# Patient Record
Sex: MALE | Race: WHITE | Employment: FULL TIME | URBAN - METROPOLITAN AREA
[De-identification: names, ages, dates, MRNs, and addresses within clinical notes are randomized per-mention and may not be internally consistent; named-entity substitution may affect disease eponyms.]

---

## 2017-01-06 ENCOUNTER — TELEPHONE (OUTPATIENT)
Dept: INTERNAL MEDICINE CLINIC | Facility: CLINIC | Age: 52
End: 2017-01-06

## 2017-01-06 NOTE — TELEPHONE ENCOUNTER
Discussed with patient. Patient has been under much stress at work. He is having difficulty with dealing with the stress and how he feels part of the time. I discussed this with him at his last visit with me in November.   I have given him Xanax but he h

## 2017-02-10 ENCOUNTER — OFFICE VISIT (OUTPATIENT)
Dept: OPTOMETRY | Facility: CLINIC | Age: 52
End: 2017-02-10

## 2017-02-10 DIAGNOSIS — H52.203 MYOPIA WITH ASTIGMATISM, BILATERAL: Primary | ICD-10-CM

## 2017-02-10 DIAGNOSIS — H52.13 MYOPIA WITH ASTIGMATISM, BILATERAL: Primary | ICD-10-CM

## 2017-02-10 PROCEDURE — 92012 INTRM OPH EXAM EST PATIENT: CPT | Performed by: OPTOMETRIST

## 2017-02-10 PROCEDURE — 92015 DETERMINE REFRACTIVE STATE: CPT | Performed by: OPTOMETRIST

## 2017-02-10 NOTE — PROGRESS NOTES
Tabby Matthews is a 46year old male. HPI:     HPI     Patient is in for an annual contact lens exam . Patient is happy with his monovision lens. Wears only a lens in the right eye for distance and no lens OS to read.  Tried a +.75 lens but does not wea Normal            Additional Tests     Amsler      Right Left   Amsler Normal Normal               Slit Lamp and Fundus Exam     External Exam      Right Left    External Normal Normal      Slit Lamp Exam      Right Left    Lids/Lashes Normal Ptosis    Con

## 2017-09-21 ENCOUNTER — OFFICE VISIT (OUTPATIENT)
Dept: INTERNAL MEDICINE CLINIC | Facility: CLINIC | Age: 52
End: 2017-09-21

## 2017-09-21 VITALS
BODY MASS INDEX: 27 KG/M2 | HEART RATE: 60 BPM | WEIGHT: 175.38 LBS | DIASTOLIC BLOOD PRESSURE: 66 MMHG | TEMPERATURE: 98 F | OXYGEN SATURATION: 98 % | SYSTOLIC BLOOD PRESSURE: 124 MMHG

## 2017-09-21 DIAGNOSIS — Z80.0 FAMILY HISTORY OF COLON CANCER: ICD-10-CM

## 2017-09-21 DIAGNOSIS — Z00.00 ANNUAL PHYSICAL EXAM: Primary | ICD-10-CM

## 2017-09-21 DIAGNOSIS — Z80.42 FAMILY HISTORY OF PROSTATE CANCER: ICD-10-CM

## 2017-09-21 DIAGNOSIS — K57.30 DIVERTICULOSIS OF COLON: ICD-10-CM

## 2017-09-21 LAB
OCCULT BLOOD, STOOL 1: NEGATIVE
PERFORMANCE MONITORS CORRECT (YES/NO): YES YES/NO

## 2017-09-21 PROCEDURE — 93005 ELECTROCARDIOGRAM TRACING: CPT | Performed by: INTERNAL MEDICINE

## 2017-09-21 PROCEDURE — 99214 OFFICE O/P EST MOD 30 MIN: CPT | Performed by: INTERNAL MEDICINE

## 2017-09-21 PROCEDURE — 99212 OFFICE O/P EST SF 10 MIN: CPT | Performed by: INTERNAL MEDICINE

## 2017-09-21 PROCEDURE — 82272 OCCULT BLD FECES 1-3 TESTS: CPT | Performed by: INTERNAL MEDICINE

## 2017-09-21 PROCEDURE — 93000 ELECTROCARDIOGRAM COMPLETE: CPT | Performed by: INTERNAL MEDICINE

## 2017-09-21 PROCEDURE — 90686 IIV4 VACC NO PRSV 0.5 ML IM: CPT | Performed by: INTERNAL MEDICINE

## 2017-09-21 PROCEDURE — 99396 PREV VISIT EST AGE 40-64: CPT | Performed by: INTERNAL MEDICINE

## 2017-09-21 PROCEDURE — 90471 IMMUNIZATION ADMIN: CPT | Performed by: INTERNAL MEDICINE

## 2017-09-21 NOTE — PROGRESS NOTES
Name and  verified. Consent form completed. Flulaval administered Left deltoid. Patient tolerated w/o complaint. No adverse reactions noted. VIS given to patient.

## 2017-09-21 NOTE — PATIENT INSTRUCTIONS
1.  Patient is to continue his current diet, medications and activity. 2.  Patient will be given his flu vaccine today. 3.  I will plan to see the patient back in 1 year for his annual physical examination.   4.  I will see the patient back sooner as kwame

## 2017-09-22 NOTE — PROGRESS NOTES
Roselyn Gonzalez is a 46year old male who presents for a complete physical exam.   HPI:   Mr. Larry Helton is a 63-year-old white male who was seen by me on September 21, 2017 for his annual physical examination.   At the time the examination Mr. Jorge Luis Zapata palpitations  GI: denies abdominal pain, N/V, diarrhea, constipation, hematochezia or melena  : No urinary complaints  NEURO: denies headaches or dizziness    EXAM:   /66 (BP Location: Left arm, Patient Position: Sitting, Cuff Size: adult)   Pulse diverticulosis. I will see the patient back sooner as necessary.    - ELECTROCARDIOGRAM, COMPLETE  - BLD OCLT PROXIDASE ACTV QUAL FECES 1 SPEC    2. Family history of prostate cancer  Stable.   CPM.  Patient's prostate examination revealed the prostate to

## 2017-12-02 ENCOUNTER — HOSPITAL ENCOUNTER (OUTPATIENT)
Age: 52
Discharge: HOME | End: 2017-12-02
Attending: EMERGENCY MEDICINE
Payer: COMMERCIAL

## 2017-12-02 VITALS
BODY MASS INDEX: 26.68 KG/M2 | TEMPERATURE: 98 F | HEART RATE: 80 BPM | DIASTOLIC BLOOD PRESSURE: 63 MMHG | SYSTOLIC BLOOD PRESSURE: 120 MMHG | OXYGEN SATURATION: 100 % | WEIGHT: 170 LBS | HEIGHT: 67 IN | RESPIRATION RATE: 18 BRPM

## 2017-12-02 DIAGNOSIS — J01.40 ACUTE NON-RECURRENT PANSINUSITIS: Primary | ICD-10-CM

## 2017-12-02 PROCEDURE — 99213 OFFICE O/P EST LOW 20 MIN: CPT

## 2017-12-02 PROCEDURE — 99202 OFFICE O/P NEW SF 15 MIN: CPT

## 2017-12-02 RX ORDER — AMOXICILLIN 875 MG/1
875 TABLET, COATED ORAL 2 TIMES DAILY
Qty: 20 TABLET | Refills: 0 | Status: SHIPPED | OUTPATIENT
Start: 2017-12-02 | End: 2017-12-12

## 2017-12-02 NOTE — ED PROVIDER NOTES
Patient Seen in: [address] [facility]    History   Patient presents with:  Cough/URI    Stated Complaint: sinus pressure    HPI    Patient is a 26-year-old male with past history of arthritis who presents now with the above.   The p and flat bilaterally. There is no posterior pharyngeal erythema.   Mild diffuse bilateral maxillary and frontal sinus tenderness to palpation  Chest: Clear to auscultation, no tenderness  Cardiovascular: Regular rate and rhythm without murmur  Abdomen: Sof

## 2017-12-02 NOTE — ED INITIAL ASSESSMENT (HPI)
Cold symptoms about 3 weeks ago. Seemed to improve but has persistent sinus pressure. Worse at night. Green mucus. Post nasal drip. No nausea. No fever. C/o facial pressure. No dizziness. Feels \"foggy\".

## 2017-12-04 ENCOUNTER — TELEPHONE (OUTPATIENT)
Dept: INTERNAL MEDICINE CLINIC | Facility: CLINIC | Age: 52
End: 2017-12-04

## 2017-12-04 NOTE — TELEPHONE ENCOUNTER
To DR BURNS and DR PADILLA  ( DR BURNS at [facility] off tomorrow)    Pt states has had a lot of nasal congestion x 2 1/2 wks  - been using afrin nasal spray multiple times a day for the 2 1/2 weeks   Went to [facility] sat and was given Amoxicillin 875 mg bid x 10 da

## 2017-12-05 NOTE — TELEPHONE ENCOUNTER
Would stop afrin-not meant to be used this long. Would try flonase 2 sprays each nostril and zyrtec daily.

## 2018-04-23 ENCOUNTER — OFFICE VISIT (OUTPATIENT)
Dept: OPTOMETRY | Facility: CLINIC | Age: 53
End: 2018-04-23

## 2018-04-23 DIAGNOSIS — H52.13 MYOPIA OF BOTH EYES WITH ASTIGMATISM: Primary | ICD-10-CM

## 2018-04-23 DIAGNOSIS — H52.203 MYOPIA OF BOTH EYES WITH ASTIGMATISM: Primary | ICD-10-CM

## 2018-04-23 PROCEDURE — 92012 INTRM OPH EXAM EST PATIENT: CPT | Performed by: OPTOMETRIST

## 2018-04-23 PROCEDURE — 92015 DETERMINE REFRACTIVE STATE: CPT | Performed by: OPTOMETRIST

## 2018-04-23 NOTE — PROGRESS NOTES
Kishore Newby is a 48year old male. HPI:     HPI     Patient is in for an annual contact lens exam. He wears AV Oasys toric in the monovision technique--wears only a left lens for distance and no lens in the right eye to read .  I tried a plus powered Yes    Mood/Affect:  Normal            Slit Lamp and Fundus Exam     External Exam       Right Left    External Normal Normal          Slit Lamp Exam       Right Left    Lids/Lashes Normal Ptosis longstanding    Conjunctiva/Sclera Normal Normal    Cornea C

## 2018-12-20 ENCOUNTER — OFFICE VISIT (OUTPATIENT)
Dept: INTERNAL MEDICINE CLINIC | Facility: CLINIC | Age: 53
End: 2018-12-20
Payer: COMMERCIAL

## 2018-12-20 VITALS
BODY MASS INDEX: 27.88 KG/M2 | DIASTOLIC BLOOD PRESSURE: 70 MMHG | TEMPERATURE: 98 F | HEIGHT: 67 IN | WEIGHT: 177.63 LBS | OXYGEN SATURATION: 99 % | HEART RATE: 60 BPM | SYSTOLIC BLOOD PRESSURE: 110 MMHG

## 2018-12-20 DIAGNOSIS — Z80.42 FAMILY HISTORY OF PROSTATE CANCER: ICD-10-CM

## 2018-12-20 DIAGNOSIS — K57.30 DIVERTICULOSIS OF COLON: ICD-10-CM

## 2018-12-20 DIAGNOSIS — Z80.0 FAMILY HISTORY OF COLON CANCER: ICD-10-CM

## 2018-12-20 DIAGNOSIS — Z00.00 ANNUAL PHYSICAL EXAM: Primary | ICD-10-CM

## 2018-12-20 PROCEDURE — 90471 IMMUNIZATION ADMIN: CPT | Performed by: INTERNAL MEDICINE

## 2018-12-20 PROCEDURE — 99396 PREV VISIT EST AGE 40-64: CPT | Performed by: INTERNAL MEDICINE

## 2018-12-20 PROCEDURE — 93000 ELECTROCARDIOGRAM COMPLETE: CPT | Performed by: INTERNAL MEDICINE

## 2018-12-20 PROCEDURE — 90686 IIV4 VACC NO PRSV 0.5 ML IM: CPT | Performed by: INTERNAL MEDICINE

## 2018-12-20 PROCEDURE — 93005 ELECTROCARDIOGRAM TRACING: CPT | Performed by: INTERNAL MEDICINE

## 2018-12-20 PROCEDURE — 82272 OCCULT BLD FECES 1-3 TESTS: CPT | Performed by: INTERNAL MEDICINE

## 2018-12-20 NOTE — PATIENT INSTRUCTIONS
1.  Patient is to continue his current diet, medication and activity. 2.  He is to continue exercise on a regular basis. 3.  I will plan see him back in 1 year.

## 2018-12-21 NOTE — PROGRESS NOTES
Tabby Matthews is a 48year old male who presents for a complete physical exam.   HPI:   Mr. Araceli Mendoza. Dony Bragg is a 51-year-old white male who was seen by me on December 20, 2018 for his annual physical examination.   At the time of examination Mr. Dony Bragg °C) (Oral)   Ht 5' 7\" (1.702 m)   Wt 177 lb 9.6 oz (80.6 kg)   SpO2 99%   BMI 27.82 kg/m²   GENERAL: well developed, well nourished,in no acute distress  SKIN: no rashes,no suspicious lesions  HEENT: atraumatic, normocephalic, normal oropharynx, ears appe FECES 1 SPEC    2. Diverticulosis of colon  Doing well.  CPM.    3. Family history of prostate cancer  Stable. CPM.  Patient's recent PSA was good at 0.7. Patient's prostate exam appears normal today.     4. Family history of colon cancer  Patient had a r

## 2019-04-04 ENCOUNTER — TELEPHONE (OUTPATIENT)
Dept: INTERNAL MEDICINE CLINIC | Facility: CLINIC | Age: 54
End: 2019-04-04

## 2019-04-04 DIAGNOSIS — Z01.84 IMMUNITY TO MEASLES DETERMINED BY SEROLOGIC TEST: Primary | ICD-10-CM

## 2019-04-04 NOTE — TELEPHONE ENCOUNTER
Pt spouse called , would like to confirm that she would like Dr Mariella Patricia to put orders in the system for pt and herself Brenda Lee :  67)  for blood test to check for measles immunity  (note entered in Rosana's chart as well)  Please call pt to c

## 2019-04-04 NOTE — TELEPHONE ENCOUNTER
Noted.  I have placed orders in the system for patient have a blood test to check for his immune status regarding measles. The blood tests will check his immune status.   Thank you!!

## 2019-04-27 ENCOUNTER — APPOINTMENT (OUTPATIENT)
Dept: LAB | Age: 54
End: 2019-04-27
Attending: INTERNAL MEDICINE
Payer: COMMERCIAL

## 2019-04-27 DIAGNOSIS — Z01.84 IMMUNITY TO MEASLES DETERMINED BY SEROLOGIC TEST: ICD-10-CM

## 2019-04-27 PROCEDURE — 86765 RUBEOLA ANTIBODY: CPT

## 2019-04-27 PROCEDURE — 36415 COLL VENOUS BLD VENIPUNCTURE: CPT

## 2019-05-07 ENCOUNTER — TELEPHONE (OUTPATIENT)
Dept: INTERNAL MEDICINE CLINIC | Facility: CLINIC | Age: 54
End: 2019-05-07

## 2019-05-08 ENCOUNTER — TELEPHONE (OUTPATIENT)
Dept: INTERNAL MEDICINE CLINIC | Facility: CLINIC | Age: 54
End: 2019-05-08

## 2019-05-08 NOTE — TELEPHONE ENCOUNTER
Telephone call to pt and message left that his recent measles titers have come back showing that pt is immune to measles and he does not need another measles vaccine(MMR).

## 2019-05-09 NOTE — TELEPHONE ENCOUNTER
Telephone call to patient and situation discussed. Patient's recent rubeola titers show his failure to be 273. Patient is immune to measles.

## 2020-01-02 ENCOUNTER — OFFICE VISIT (OUTPATIENT)
Dept: INTERNAL MEDICINE CLINIC | Facility: CLINIC | Age: 55
End: 2020-01-02
Payer: COMMERCIAL

## 2020-01-02 VITALS
BODY MASS INDEX: 28.6 KG/M2 | DIASTOLIC BLOOD PRESSURE: 80 MMHG | WEIGHT: 182.19 LBS | HEIGHT: 67 IN | SYSTOLIC BLOOD PRESSURE: 110 MMHG | TEMPERATURE: 98 F | HEART RATE: 56 BPM | OXYGEN SATURATION: 98 %

## 2020-01-02 DIAGNOSIS — Z80.42 FAMILY HISTORY OF PROSTATE CANCER: ICD-10-CM

## 2020-01-02 DIAGNOSIS — Z80.0 FAMILY HISTORY OF COLON CANCER: ICD-10-CM

## 2020-01-02 DIAGNOSIS — Z00.00 ANNUAL PHYSICAL EXAM: Primary | ICD-10-CM

## 2020-01-02 DIAGNOSIS — K57.30 DIVERTICULOSIS OF COLON: ICD-10-CM

## 2020-01-02 LAB
OCCULT BLOOD, STOOL 1: NEGATIVE
PERFORMANCE MONITORS CORRECT (YES/NO): YES YES/NO

## 2020-01-02 PROCEDURE — 82272 OCCULT BLD FECES 1-3 TESTS: CPT | Performed by: INTERNAL MEDICINE

## 2020-01-02 PROCEDURE — 93000 ELECTROCARDIOGRAM COMPLETE: CPT | Performed by: INTERNAL MEDICINE

## 2020-01-02 PROCEDURE — 90686 IIV4 VACC NO PRSV 0.5 ML IM: CPT | Performed by: INTERNAL MEDICINE

## 2020-01-02 PROCEDURE — 90471 IMMUNIZATION ADMIN: CPT | Performed by: INTERNAL MEDICINE

## 2020-01-02 PROCEDURE — 99396 PREV VISIT EST AGE 40-64: CPT | Performed by: INTERNAL MEDICINE

## 2020-01-02 PROCEDURE — 99214 OFFICE O/P EST MOD 30 MIN: CPT | Performed by: INTERNAL MEDICINE

## 2020-01-02 NOTE — PATIENT INSTRUCTIONS
1.  Patient is to continue his current diet exercise and activity. 2.  Patient may use ibuprofen 2 tablets to 3 times a day as necessary for pain in his thumb or low back. He should not take the medication for more than 7 to 10 days.   3.  Patient is to s

## 2020-01-03 NOTE — PROGRESS NOTES
Margie Marsh is a 47year old male who presents for a complete physical exam.   HPI:   Mr. Grace Gravesalisha Hicks is a 80-year-old white male who was seen by me in January 2, 2020 for his annual physical examination.   At the time of the examination Mr. Hadley Torre constipation, hematochezia or melena  : No urinary complaints  NEURO: denies headaches or dizziness    EXAM:   /80 (BP Location: Left arm, Patient Position: Sitting, Cuff Size: adult)   Pulse 56   Temp 97.5 °F (36.4 °C) (Oral)   Ht 5' 7\" (1.702 m) of prostate cancer  Doing well.  CPM.  Patient's prostate examination is normal.  His prostate is normal with no nodules. Patient notes that her recent PSA done at work was \"low\".     4. Family history of colon cancer  Doing well.  CPM.  Patient has a fa

## (undated) NOTE — MR AVS SNAPSHOT
Mel  [address]  [phone]               Thank you for choosing us for your health care visit with [facility], [facility].   We are glad to serve you and happy to provide you with this summary of Visit [facility] online at  [facility].tn